# Patient Record
Sex: MALE | Race: WHITE | NOT HISPANIC OR LATINO | Employment: OTHER | URBAN - NONMETROPOLITAN AREA
[De-identification: names, ages, dates, MRNs, and addresses within clinical notes are randomized per-mention and may not be internally consistent; named-entity substitution may affect disease eponyms.]

---

## 2017-01-02 ENCOUNTER — RESULTS ENCOUNTER (OUTPATIENT)
Dept: FAMILY MEDICINE CLINIC | Facility: CLINIC | Age: 47
End: 2017-01-02

## 2017-01-02 DIAGNOSIS — F41.9 ANXIETY: ICD-10-CM

## 2017-01-02 DIAGNOSIS — I10 ESSENTIAL HYPERTENSION: ICD-10-CM

## 2017-01-02 DIAGNOSIS — E66.09 NON MORBID OBESITY DUE TO EXCESS CALORIES: ICD-10-CM

## 2017-01-02 DIAGNOSIS — E55.9 HYPOVITAMINOSIS D: ICD-10-CM

## 2017-01-02 DIAGNOSIS — E78.2 MIXED HYPERLIPIDEMIA: ICD-10-CM

## 2017-01-02 DIAGNOSIS — R73.02 GLUCOSE INTOLERANCE (IMPAIRED GLUCOSE TOLERANCE): ICD-10-CM

## 2017-01-18 LAB
25(OH)D3+25(OH)D2 SERPL-MCNC: 51.4 NG/ML (ref 30–100)
ALBUMIN SERPL-MCNC: 4.7 G/DL (ref 3.5–5.5)
ALBUMIN/GLOB SERPL: 1.9 {RATIO} (ref 1.1–2.5)
ALP SERPL-CCNC: 47 IU/L (ref 39–117)
ALT SERPL-CCNC: 46 IU/L (ref 0–44)
AST SERPL-CCNC: 23 IU/L (ref 0–40)
BASOPHILS # BLD AUTO: 0.1 X10E3/UL (ref 0–0.2)
BASOPHILS NFR BLD AUTO: 0 %
BILIRUB SERPL-MCNC: 0.6 MG/DL (ref 0–1.2)
BUN SERPL-MCNC: 21 MG/DL (ref 6–24)
BUN/CREAT SERPL: 18 (ref 9–20)
CALCIUM SERPL-MCNC: 9.8 MG/DL (ref 8.7–10.2)
CHLORIDE SERPL-SCNC: 100 MMOL/L (ref 96–106)
CHOLEST SERPL-MCNC: 155 MG/DL (ref 100–199)
CO2 SERPL-SCNC: 25 MMOL/L (ref 18–29)
CREAT SERPL-MCNC: 1.2 MG/DL (ref 0.76–1.27)
EOSINOPHIL # BLD AUTO: 0.2 X10E3/UL (ref 0–0.4)
EOSINOPHIL NFR BLD AUTO: 2 %
ERYTHROCYTE [DISTWIDTH] IN BLOOD BY AUTOMATED COUNT: 13 % (ref 12.3–15.4)
GLOBULIN SER CALC-MCNC: 2.5 G/DL (ref 1.5–4.5)
GLUCOSE SERPL-MCNC: 113 MG/DL (ref 65–99)
HBA1C MFR BLD: 5.6 % (ref 4.8–5.6)
HCT VFR BLD AUTO: 45.9 % (ref 37.5–51)
HDLC SERPL-MCNC: 30 MG/DL
HGB BLD-MCNC: 15.7 G/DL (ref 12.6–17.7)
IMM GRANULOCYTES # BLD: 0 X10E3/UL (ref 0–0.1)
IMM GRANULOCYTES NFR BLD: 0 %
LDLC SERPL CALC-MCNC: 75 MG/DL (ref 0–99)
LYMPHOCYTES # BLD AUTO: 3.1 X10E3/UL (ref 0.7–3.1)
LYMPHOCYTES NFR BLD AUTO: 27 %
MCH RBC QN AUTO: 32.9 PG (ref 26.6–33)
MCHC RBC AUTO-ENTMCNC: 34.2 G/DL (ref 31.5–35.7)
MCV RBC AUTO: 96 FL (ref 79–97)
MONOCYTES # BLD AUTO: 1.1 X10E3/UL (ref 0.1–0.9)
MONOCYTES NFR BLD AUTO: 10 %
NEUTROPHILS # BLD AUTO: 7.2 X10E3/UL (ref 1.4–7)
NEUTROPHILS NFR BLD AUTO: 61 %
PLATELET # BLD AUTO: 254 X10E3/UL (ref 150–379)
POTASSIUM SERPL-SCNC: 5.1 MMOL/L (ref 3.5–5.2)
PROT SERPL-MCNC: 7.2 G/DL (ref 6–8.5)
RBC # BLD AUTO: 4.77 X10E6/UL (ref 4.14–5.8)
SODIUM SERPL-SCNC: 142 MMOL/L (ref 134–144)
TRIGL SERPL-MCNC: 249 MG/DL (ref 0–149)
VLDLC SERPL CALC-MCNC: 50 MG/DL (ref 5–40)
WBC # BLD AUTO: 11.6 X10E3/UL (ref 3.4–10.8)

## 2017-01-30 RX ORDER — FENOFIBRATE 145 MG/1
145 TABLET, COATED ORAL DAILY
Qty: 60 TABLET | Refills: 0 | Status: SHIPPED | OUTPATIENT
Start: 2017-01-30 | End: 2017-03-14 | Stop reason: SDUPTHER

## 2017-03-14 ENCOUNTER — OFFICE VISIT (OUTPATIENT)
Dept: FAMILY MEDICINE CLINIC | Facility: CLINIC | Age: 47
End: 2017-03-14

## 2017-03-14 VITALS
SYSTOLIC BLOOD PRESSURE: 128 MMHG | DIASTOLIC BLOOD PRESSURE: 68 MMHG | BODY MASS INDEX: 36.73 KG/M2 | TEMPERATURE: 98.6 F | HEART RATE: 99 BPM | OXYGEN SATURATION: 98 % | RESPIRATION RATE: 16 BRPM | HEIGHT: 69 IN | WEIGHT: 248 LBS

## 2017-03-14 DIAGNOSIS — I10 ESSENTIAL HYPERTENSION: ICD-10-CM

## 2017-03-14 DIAGNOSIS — M41.9 SCOLIOSIS OF CERVICOTHORACIC SPINE, UNSPECIFIED SCOLIOSIS TYPE: ICD-10-CM

## 2017-03-14 DIAGNOSIS — E55.9 HYPOVITAMINOSIS D: ICD-10-CM

## 2017-03-14 DIAGNOSIS — F17.210 SMOKES 1 PACK OF CIGARETTES PER DAY: ICD-10-CM

## 2017-03-14 DIAGNOSIS — M54.5 CHRONIC BILATERAL LOW BACK PAIN, WITH SCIATICA PRESENCE UNSPECIFIED: Primary | ICD-10-CM

## 2017-03-14 DIAGNOSIS — E78.2 MIXED HYPERLIPIDEMIA: ICD-10-CM

## 2017-03-14 DIAGNOSIS — G89.29 CHRONIC BILATERAL LOW BACK PAIN, WITH SCIATICA PRESENCE UNSPECIFIED: Primary | ICD-10-CM

## 2017-03-14 DIAGNOSIS — F41.9 ANXIETY: ICD-10-CM

## 2017-03-14 PROCEDURE — 99214 OFFICE O/P EST MOD 30 MIN: CPT | Performed by: FAMILY MEDICINE

## 2017-03-14 RX ORDER — FENOFIBRATE 145 MG/1
145 TABLET, COATED ORAL DAILY
Qty: 60 TABLET | Refills: 0 | Status: SHIPPED | OUTPATIENT
Start: 2017-03-14 | End: 2017-06-19 | Stop reason: SDUPTHER

## 2017-03-14 RX ORDER — ALPRAZOLAM 1 MG/1
1 TABLET ORAL AS NEEDED
Qty: 60 TABLET | Refills: 0 | Status: SHIPPED | OUTPATIENT
Start: 2017-03-14 | End: 2017-06-19 | Stop reason: SDUPTHER

## 2017-03-14 NOTE — PROGRESS NOTES
Chief Complaint   Patient presents with   • Med Refill     Patient said no problems today.        History:  Madhu Ochoa is a 46 y.o. male presents who today for evaluation of the above problems.  PCP currently listed as Roberto Castillo MD.   HTN: Stable, doing well no cough, no SOA, no chest pain.  He is doing well.   Hyperlipidemia:  He has history of mixed process. On tricor and mevachor.  He is using this q 3 days.  Doing well.  Last trigly 249, up from 230 prior.  HDL 30 down from 31 prior.  And LDL was 75 and at goal, down from 83 prior.  Anxiety up and down. Nothing is worse.  Family events are stable, normal at this time.  He is doing very well. Logan [id number]. Percocet filled 2/23/17 [facility], 1/25/17 [facility], 12/27/16 [facility].  Xaax 1mg #60 filled by me 12/15/16.  He has #1 left.  Obesity: Down 4 lb from last OV.  Weighed 241.4 at home.  He has been watching carbs.  Using garcinia, which has helped.  He has gym membership. He is not using this.  I encouraged minimum 15 minutes walking daily.  Over past few months the schedule has gotten worse, the patient wants to get off of the percocet.  He has to wait 8 hours in the office and he just cannot continue to do this. Patient wants to stop using the rx.  He has gone from 1 Perc 10/325 to 1/2 TID.  He has done this since 2/23/17.  He had 120 to begin.  He has not talked with Dr. Hsieh (drives from [facility]). He has refill 3/23/17 to get filled.  He goes to [place] but never knows when he will be there. Not consistent about calling patients.  He has issues getting into office.  They will know at first of week if he will be there later in week.  He has Rx for 120.  History of scoliosis, history of back pain, history of compression fracture thoracic, SI joint pain/issues.  No imaging in 2 years. +Osteopenia.  2 Dexa has been stable.     Madhu Ochoa  has a past medical history of Acid reflux; Allergic; Anxiety; Asthma; Hyperglycemia; Hyperlipidemia;  Hypertension; Low back pain; Obesity; Osteopenia; and Scoliosis.    Allergies   Allergen Reactions   • Naprosyn [Naproxen] GI Intolerance     Past Medical History   Diagnosis Date   • Acid reflux    • Allergic    • Anxiety    • Asthma    • Hyperglycemia    • Hyperlipidemia    • Hypertension    • Low back pain    • Obesity    • Osteopenia    • Scoliosis      Past Surgical History   Procedure Laterality Date   • Hernia repair       Family History   Problem Relation Age of Onset   • Hypertension Mother    • Diabetes Mother    • Hypertension Father    • Diabetes Father    • Cancer Maternal Grandmother      colon cancer   • Leukemia Maternal Grandfather    • No Known Problems Paternal Grandmother    • No Known Problems Paternal Grandfather          Current Outpatient Prescriptions:   •  Albuterol Sulfate (VENTOLIN HFA IN), Inhale As Needed., Disp: , Rfl:   •  ALPRAZolam (XANAX) 1 MG tablet, Take 1 tablet by mouth As Needed for Anxiety., Disp: 60 tablet, Rfl: 0  •  baclofen (LIORESAL) 10 MG tablet, Take 10 mg by mouth 3 (Three) Times a Day., Disp: , Rfl:   •  Cholecalciferol (VITAMIN D3) 5000 UNITS capsule capsule, Take 5,000 Units by mouth Daily., Disp: , Rfl:   •  diclofenac (VOLTAREN) 1 % gel gel, Apply 4 g topically Daily., Disp: , Rfl:   •  fenofibrate (TRICOR) 145 MG tablet, Take 1 tablet by mouth Daily., Disp: 60 tablet, Rfl: 0  •  lisinopril-hydrochlorothiazide (PRINZIDE,ZESTORETIC) 20-12.5 MG per tablet, Take 1 tablet by mouth Daily., Disp: , Rfl:   •  lovastatin (MEVACOR) 20 MG tablet, Take 20 mg by mouth Every Night., Disp: , Rfl:   •  Omega-3 Fatty Acids (OMEGA-3 FISH OIL PO), Take  by mouth Daily., Disp: , Rfl:   •  Omeprazole Magnesium (PRILOSEC OTC PO), Take  by mouth Daily., Disp: , Rfl:   •  oxyCODONE-acetaminophen (PERCOCET)  MG per tablet, Daily., Disp: , Rfl: 0  •  tiZANidine (ZANAFLEX) 4 MG tablet, Take 4 mg by mouth At Night As Needed for muscle spasms., Disp: , Rfl:     ROS:  Review of Systems  "  Constitutional: Negative for activity change, appetite change and chills.   HENT: Negative for congestion, dental problem and drooling.    Eyes: Negative for pain, discharge and itching.   Respiratory: Negative for apnea, choking and chest tightness.    Cardiovascular: Negative for chest pain, palpitations and leg swelling.   Gastrointestinal: Negative for abdominal distention and abdominal pain.   Endocrine: Negative for cold intolerance, heat intolerance and polydipsia.   Genitourinary: Negative for difficulty urinating, dysuria and enuresis.   Musculoskeletal: Negative for arthralgias, back pain and gait problem.   Skin: Negative for color change and pallor.   Neurological: Negative for dizziness, facial asymmetry and headaches.   Hematological: Negative for adenopathy. Does not bruise/bleed easily.   Psychiatric/Behavioral: Negative for agitation, behavioral problems and confusion.       OBJECTIVE:  Visit Vitals   • /68 (BP Location: Right arm, Patient Position: Sitting, Cuff Size: Large Adult)   • Pulse 99   • Temp 98.6 °F (37 °C) (Oral)   • Resp 16   • Ht 69\" (175.3 cm)   • Wt 248 lb (112 kg)   • SpO2 98%   • BMI 36.62 kg/m2      Physical Exam   Constitutional: He is oriented to person, place, and time. He appears well-developed and well-nourished.   HENT:   Head: Normocephalic and atraumatic.   Right Ear: External ear normal.   Left Ear: External ear normal.   Nose: Nose normal.   Mouth/Throat: Oropharynx is clear and moist.   Head- normocephalic and atraumatic.  Neck- without visible/palpable lumps or pulsations.  Palpation- No bony tenderness about head/neck along frontal, occipital, temporal, parietal, mastoid, jawline, zygoma, orbit or any other location.  NO temporal artery tenderness. No TMJ tenderness. Neck Supple.  Thyroid-No thyromegaly, no nodules     Eyes: Conjunctivae and EOM are normal. Pupils are equal, round, and reactive to light.   Neck: Normal range of motion. Neck supple. No " thyromegaly present.   Cardiovascular: Normal rate, regular rhythm, normal heart sounds and intact distal pulses.    No murmur heard.  Pulmonary/Chest: Effort normal and breath sounds normal. No respiratory distress. He has no wheezes. He exhibits no tenderness.   Abdominal: Soft. Bowel sounds are normal. There is no tenderness. There is no rebound and no guarding.   Musculoskeletal:        Thoracic back: He exhibits decreased range of motion, tenderness, pain and spasm. He exhibits no bony tenderness.        Lumbar back: He exhibits decreased range of motion, tenderness, pain and spasm. He exhibits no bony tenderness.   Straight leg normal bilaterally both sitting and supine.  patellar and achilles reflexes normal.  No ankle clonus,       Skin Integrity  -  He has callous right foot and right heel is dry and cracked.  He hasno right foot ulcer,  no ingrown toenail on right foot and no right foot warmth.He has callous left foot and left heel dry and cracked. He has no left foot ulcer, no left ingrown toenail and no left foot warmth..  Lymphadenopathy:     He has no cervical adenopathy.   Neurological: He is alert and oriented to person, place, and time. No cranial nerve deficit. Coordination normal.   Skin: Skin is warm and dry. No rash noted.   Psychiatric: He has a normal mood and affect. His behavior is normal. Judgment and thought content normal.   Nursing note and vitals reviewed.      Assessment/Plan:  Back pain:  Chronic thoracolumbar pain.  Historically following with pain management. No recent imaging. He is interested in getting off of the pain meds.  He is currently using 1/2 dose as previously.  I discussed that his plan is reasonable but I could not wean patients from opiates.  X-rays to be ordered.  We will f/u with results.  Offered phys therapy. Offered topical agents.  He is stable at this time. Weight loss encouraged.    Anxiety: Chronic and stable problem. Using medications as prescribed.   Discussed need to take regularly as prescribed, to avoid missing doses as able.  Medications reviewed with patient today. We discussed cessation/continuation of medications for current problem.  Needed Rx refills will be provided.  No side effects from medications.  Risks/benefits to current therapy discussed. NO red flags. Reviewed requirements of House Bill 1 today with patient.  History discussed, discussed need to complete physical exam and action plan roughly minimum q 3 months to maintain ability to receive controlled substances. Discussed personal and family history of substance abuse.  Informed consent for controlled substances will be reviewed and signed at initation of treatment, as Rx changes and annually. The potential adictive nature of medications discussed.  Liliana requested today and will be checked with each office visit, where controlled Rx are written.  Discussed random drug screens/pill counts.  Appropriate methods for medication destruction were discussed to include police department.  Discussed to not matt, keep, maintain or share medications.  It is illegal to carry these in unmarked bottle. Leave in original bottle.  We discussed options for treatment to include medications or to not use medications. Alternative therapies discussed.  The patient understands the risks associated with this controlled medication, including tolerance and addiction. Patient also agrees to only obtain this medication from me, and not from a another provider, unless that provider is covering for me in my absence. Patient also agrees to be compliant in dosing, and not self adjust the dose of medication. The patient has signed a consent for treatment with a controlled substance as per [facility] policy. LILIANA is obtained.  Plan of care reviewed.  We will continue Rx.   · Continue current meds  · liliana report  · Informed consent up to date.    Tobacco abuse: Tobacco Cessation discussed today for 2 minutes.    Ready to quit: no. The risks and hazards of continued tobacco abuse were discussed with the patient today and total tobacco cessation as recommended. It was clearly and unambiguously explained that continued tobacco usage will adversely affect overall morbidity and mortality of the patient.  Patient was informed that tobacco use can lead to numerous cancers, worsening of cardiovascular and pulmonary systems and that lung damage is often permanent and irreversible. As tobacco addiction is often severe, cessation often seems insurmountable to many patients.  I discussed an incremental decrease in usage over time, with the ultimate goal of cessation.  I have offered Smoking Cessation classes through  for assistance.  I have discussed the possibility of lifestyle modifications to enhance the likelihood of successful tobacco cessation. Patient is aware of these services.  I advised the patient to inform me if any further assistance is requested, as we can offer counseling services, nicotine replacement inhaled, patch, lozenge, gum, or prescription medications to include Chantix or Wellbutrin for assistance.  I will reassess the interest in tobacco cessation at the next and all subsequent visits.  · Handouts were offered to the patient regarding tobacco cessation.   · Recommended to pick a quit date  · Lifestyle choices discussed.    · We discussed benefits/risks of oral medications to include Chantix/Wellbutrin.   · Discussed benefits and risks to nicotine patches, gum, lozenges, inhaler.   · Discussed no benefit and no recommendation at this time to switch to E. Cigarettes as health hazards are not yet known.    · Discussed  cessation class and handout offered to patient today.    · Discussed to consider ration technique to quit with time (Each day set out the number of cigarettes that you allow yourself to smoke.  Each day decrease this number by 1 cigarrette until you get to a point that you feel you need another  cigarette.  You can hold at that level x 1 week.  Continue to decrease until you either are tobacco free or until you cannot decline any further). When you cannot decrease this any further you can return and we can discussed medication options again.  Initial goal with reduction technique is 25% in 3 months.   · http://smokefree.gov/smokefreetxt/  · www.heart.org Smoking cessation resources  · 1 800 QUIT NOW number d/w patient.      Elevated Cholesterol: last labs Done in 01/17.  Stable/chronic.  Continue tricor, he has mixed pattern.  R/B/A to meds d/w patient, SE reviewed, handout offered regarding medications listed.    Monitor.     HTN: essential HTN at goal <140/90.  Good BP control is encouraged with Goal BP based on JNC 8 guidelines:  For the general population <60 yr old goal BP <140/90 and for those >60 <150/90.  For patients of all ages with Diabetes, CKD, Known CAD the goal is <140/90. Reviewed typical first line agents to include thiazide diuretic or ace-I or ARB or CCB alone or in combo. At goal yes.  If not already owned, I recommended to the patient to obtain electronic home BP machine with upper arm blood pressure cuff and to check regularly as instructed.  Keep BP log and bring to subsequent visits.    · Offered handout on HTN educational topics: HTN, Low sodium diet.   These were provided if patient requested these today.  · ADA diet encouraged.  · Monitor cholesterol regularly  · Monitor weight with goal of BMI <30.  · Consider taking Rx at night as recent study supports this may decreased chances of leading to DM.   · Consider bringing home BP cuff to office to compare readings with ours.  · Checking BP at home can help to lower BP.  Certain medications and substances can increase BP to include: NSAIDS, caffeine, decongestants, nicotine.    · Well controlled and BP goal for age is <140/90 per JNC 8 guidelines     Hypovitaminosis D: Normal 01/2017.             Orders Placed Today:  Madhu was seen  today for med refill.    Diagnoses and all orders for this visit:    Chronic bilateral low back pain, with sciatica presence unspecified  -     XR Spine Thoracic 4+ View (In Office)  -     XR Spine Lumbar 4+ View; Future    Hypovitaminosis D    Scoliosis of cervicothoracic spine, unspecified scoliosis type    Anxiety  -     ALPRAZolam (XANAX) 1 MG tablet; Take 1 tablet by mouth As Needed for Anxiety.    Smokes 1 pack of cigarettes per day    Essential hypertension    Mixed hyperlipidemia    Other orders  -     fenofibrate (TRICOR) 145 MG tablet; Take 1 tablet by mouth Daily.        Risks/benefits of current and new medications discussed with the patient and or family today.  The patient/family are aware and accept that if there any side effects they should call or return to clinic as soon as possible.  Appropriate F/U discussed for topics addressed today. All questions were answered to the satisfactory state of patient/family.  Should symptoms fail to improve or worsen they agree to call or return to clinic or to go to the ER. Education handouts were offered on any new Rx if requested.  Discussed the importance of following up with any needed screening tests/labs/specialist appointments and any requested follow-up recommended by me today.  Importance of maintaining follow-up discussed and patient accepts that missed appointments can delay diagnosis and potentially lead to worsening of conditions.    An After Visit Summary was printed and given to the patient at discharge.  Return in about 3 months (around 6/14/2017).         Roberto Castillo M.D. 3/14/2017

## 2017-06-19 ENCOUNTER — OFFICE VISIT (OUTPATIENT)
Dept: FAMILY MEDICINE CLINIC | Facility: CLINIC | Age: 47
End: 2017-06-19

## 2017-06-19 VITALS
TEMPERATURE: 98.4 F | BODY MASS INDEX: 35.81 KG/M2 | OXYGEN SATURATION: 99 % | SYSTOLIC BLOOD PRESSURE: 112 MMHG | WEIGHT: 241.8 LBS | HEART RATE: 81 BPM | DIASTOLIC BLOOD PRESSURE: 60 MMHG | HEIGHT: 69 IN | RESPIRATION RATE: 16 BRPM

## 2017-06-19 DIAGNOSIS — F17.210 SMOKES 1 PACK OF CIGARETTES PER DAY: ICD-10-CM

## 2017-06-19 DIAGNOSIS — E66.09 NON MORBID OBESITY DUE TO EXCESS CALORIES: ICD-10-CM

## 2017-06-19 DIAGNOSIS — E55.9 HYPOVITAMINOSIS D: ICD-10-CM

## 2017-06-19 DIAGNOSIS — I10 ESSENTIAL HYPERTENSION: ICD-10-CM

## 2017-06-19 DIAGNOSIS — R73.9 HYPERGLYCEMIA: ICD-10-CM

## 2017-06-19 DIAGNOSIS — M54.5 CHRONIC BILATERAL LOW BACK PAIN, WITH SCIATICA PRESENCE UNSPECIFIED: ICD-10-CM

## 2017-06-19 DIAGNOSIS — K21.9 GASTROESOPHAGEAL REFLUX DISEASE WITHOUT ESOPHAGITIS: ICD-10-CM

## 2017-06-19 DIAGNOSIS — G89.29 CHRONIC BILATERAL LOW BACK PAIN, WITH SCIATICA PRESENCE UNSPECIFIED: ICD-10-CM

## 2017-06-19 DIAGNOSIS — E78.2 MIXED HYPERLIPIDEMIA: ICD-10-CM

## 2017-06-19 DIAGNOSIS — F41.9 ANXIETY: Primary | ICD-10-CM

## 2017-06-19 PROCEDURE — 99214 OFFICE O/P EST MOD 30 MIN: CPT | Performed by: FAMILY MEDICINE

## 2017-06-19 RX ORDER — ALPRAZOLAM 1 MG/1
1 TABLET ORAL AS NEEDED
Qty: 60 TABLET | Refills: 0 | Status: SHIPPED | OUTPATIENT
Start: 2017-06-19 | End: 2017-09-25 | Stop reason: SDUPTHER

## 2017-06-19 RX ORDER — LISINOPRIL AND HYDROCHLOROTHIAZIDE 20; 12.5 MG/1; MG/1
1 TABLET ORAL DAILY
Qty: 90 TABLET | Refills: 2 | Status: SHIPPED | OUTPATIENT
Start: 2017-06-19

## 2017-06-19 RX ORDER — FENOFIBRATE 145 MG/1
145 TABLET, COATED ORAL DAILY
Qty: 90 TABLET | Refills: 2 | Status: SHIPPED | OUTPATIENT
Start: 2017-06-19

## 2017-06-19 RX ORDER — LOVASTATIN 20 MG/1
20 TABLET ORAL NIGHTLY
Qty: 90 TABLET | Refills: 2 | Status: ON HOLD | OUTPATIENT
Start: 2017-06-19 | End: 2021-05-21

## 2017-06-19 NOTE — PROGRESS NOTES
Chief Complaint   Patient presents with   • Follow-up     3 month f/u and medication refills.        History:  Madhu Ochoa is a 46 y.o. male presents who today for evaluation of the above problems.  PCP currently listed as Roberto Castillo MD.   HTN stable, doing very well.  Weight down 7 lb from last OV.  He is eating low carbs, exercising more.  He is taking meds as rx.  Percocet he is down to 1/2 tab daily, none today and none in past 48 hours.  Longest stretch he has gone w/o pain meds.  He feels well.  Tylenol is working just as well.  He is happy to try to get off Rx.  He is working to get out of withdrawal issues. He has 41 of the percocet left. This should last a long time if he is only using them 1/2 tablet PRN daily.  Back pain is about a 2/10.  We discussed continued weight loss.  He has not been checking sugars.  He is using xanax daily 1/2 tablet daily.   He has 1/2 tablet left of the xanax.  3/14/17 #60 has lasted until now.  This is stable, anxiety is stable.  GERD stable.  Allergies stable at present but up and down. Polished and waxed floors at work and this caused his allergies to get worse.  We discussed astelin nasal.  Breathing stable.   He just had baclofen and zanaflex refilled.  Baclofen TID daytime and zanaflex only QHS.  This works better but makes him tired os he takes it QHS.   Needs refills on his BP meds and cholesterol meds.     Madhu Ochoa  has a past medical history of Acid reflux; Allergic; Anxiety; Asthma; Hyperglycemia; Hyperlipidemia; Hypertension; Low back pain; Obesity; Osteopenia; and Scoliosis.    Allergies   Allergen Reactions   • Naprosyn [Naproxen] GI Intolerance     Past Medical History:   Diagnosis Date   • Acid reflux    • Allergic    • Anxiety    • Asthma    • Hyperglycemia    • Hyperlipidemia    • Hypertension    • Low back pain    • Obesity    • Osteopenia    • Scoliosis      Past Surgical History:   Procedure Laterality Date   • HERNIA REPAIR       Family  History   Problem Relation Age of Onset   • Hypertension Mother    • Diabetes Mother    • Hypertension Father    • Diabetes Father    • Cancer Maternal Grandmother      colon cancer   • Leukemia Maternal Grandfather    • No Known Problems Paternal Grandmother    • No Known Problems Paternal Grandfather        Current Outpatient Prescriptions on File Prior to Visit   Medication Sig Dispense Refill   • Albuterol Sulfate (VENTOLIN HFA IN) Inhale As Needed.     • ALPRAZolam (XANAX) 1 MG tablet Take 1 tablet by mouth As Needed for Anxiety. 60 tablet 0   • baclofen (LIORESAL) 10 MG tablet Take 10 mg by mouth 3 (Three) Times a Day.     • Cholecalciferol (VITAMIN D3) 5000 UNITS capsule capsule Take 5,000 Units by mouth Daily.     • fenofibrate (TRICOR) 145 MG tablet Take 1 tablet by mouth Daily. 60 tablet 0   • lisinopril-hydrochlorothiazide (PRINZIDE,ZESTORETIC) 20-12.5 MG per tablet Take 1 tablet by mouth Daily.     • lovastatin (MEVACOR) 20 MG tablet Take 20 mg by mouth Every Night.     • Omega-3 Fatty Acids (OMEGA-3 FISH OIL PO) Take  by mouth Daily.     • Omeprazole Magnesium (PRILOSEC OTC PO) Take  by mouth Daily.     • oxyCODONE-acetaminophen (PERCOCET)  MG per tablet Daily.  0   • tiZANidine (ZANAFLEX) 4 MG tablet Take 4 mg by mouth At Night As Needed for muscle spasms.     • diclofenac (VOLTAREN) 1 % gel gel Apply 4 g topically Daily.       No current facility-administered medications on file prior to visit.        Family history, surgical history, past medical history, Allergies and meds reviewed with patient today and updated in [facility] EMR.     ROS:  Review of Systems   Constitutional: Negative for activity change, appetite change and chills.   HENT: Negative for congestion, dental problem and drooling.    Eyes: Negative for pain, discharge and itching.   Respiratory: Negative for apnea, choking and chest tightness.    Cardiovascular: Negative for chest pain, palpitations and leg swelling.   Gastrointestinal:  "Negative for abdominal distention and abdominal pain.   Endocrine: Negative for cold intolerance, heat intolerance and polydipsia.   Genitourinary: Negative for difficulty urinating, dysuria and enuresis.   Musculoskeletal: Positive for back pain (actually stable at this time, self weaning off pain medications.). Negative for arthralgias and gait problem.   Skin: Negative for color change and pallor.   Neurological: Negative for dizziness, facial asymmetry and headaches.   Hematological: Negative for adenopathy. Does not bruise/bleed easily.   Psychiatric/Behavioral: Negative for agitation, behavioral problems and confusion.       OBJECTIVE:  Vitals:    06/19/17 1627   BP: 112/60   Pulse: 81   Resp: 16   Temp: 98.4 °F (36.9 °C)   TempSrc: Oral   SpO2: 99%   Weight: 241 lb 12.8 oz (110 kg)   Height: 69\" (175.3 cm)     Physical Exam   Constitutional: He is oriented to person, place, and time. He appears well-developed and well-nourished.   HENT:   Head: Normocephalic and atraumatic.   Right Ear: External ear normal.   Left Ear: External ear normal.   Nose: Nose normal.   Mouth/Throat: Oropharynx is clear and moist.   Head- normocephalic and atraumatic.  Neck- without visible/palpable lumps or pulsations.  Palpation- No bony tenderness about head/neck along frontal, occipital, temporal, parietal, mastoid, jawline, zygoma, orbit or any other location.  NO temporal artery tenderness. No TMJ tenderness. Neck Supple.  Thyroid-No thyromegaly, no nodules     Eyes: Conjunctivae and EOM are normal. Pupils are equal, round, and reactive to light.   Neck: Normal range of motion. Neck supple. No thyromegaly present.   Cardiovascular: Normal rate, regular rhythm, normal heart sounds and intact distal pulses.    No murmur heard.  Pulmonary/Chest: Effort normal and breath sounds normal. No respiratory distress. He has no wheezes. He exhibits no tenderness.   Abdominal: Soft. Bowel sounds are normal. There is no tenderness. There is " no rebound and no guarding.   Musculoskeletal:        Right hip: He exhibits normal range of motion, normal strength and no tenderness.        Left hip: He exhibits normal range of motion, normal strength and no tenderness.        Thoracic back: He exhibits decreased range of motion, tenderness, pain and spasm. He exhibits no bony tenderness.        Lumbar back: He exhibits decreased range of motion, tenderness, pain and spasm. He exhibits no bony tenderness.   Straight leg normal bilaterally both sitting and supine.  patellar and achilles reflexes normal.  No ankle clonus,  SONA negative, FADIR negative.  Straight leg raise negative.       Skin Integrity  -  He has callous right foot and right heel is dry and cracked.  He hasno right foot ulcer,  no ingrown toenail on right foot and no right foot warmth.He has callous left foot and left heel dry and cracked. He has no left foot ulcer, no left ingrown toenail and no left foot warmth..  Lymphadenopathy:     He has no cervical adenopathy.   Neurological: He is alert and oriented to person, place, and time. No cranial nerve deficit. Coordination normal.   Skin: Skin is warm and dry. No rash noted.   Psychiatric: He has a normal mood and affect. His behavior is normal. Judgment and thought content normal.   Nursing note and vitals reviewed.    Lab Results   Component Value Date    HGBA1C 5.6 01/17/2017     Assessment/Plan:    Smokes 1 pack of cigarettes per day  Comments:  He has tried patches with regular gum.  Not interested in cessation at present.  Discuss again at next OV.     Anxiety: The patient has read and signed the [facility] Controlled Substance Contract.  I will continue to see patient for regular follow up appointments.  They are well controlled on their medication.  LILIANA is updated every 3 months. The patient is aware of the potential for addiction and dependence. Chronic use of once daily xanax 0.5-1 tablet today.    ORDER  -     ALPRAZolam  (XANAX) 1 MG tablet; Take 1 tablet by mouth As Needed for Anxiety.    Chronic bilateral low back pain, with sciatica presence unspecified    Hypovitaminosis D: Stable, chronic problem. The patient had normal Vit D in 01/2017.  NO replacement for now.  Adequate vit D intake encouraged.  Sunlight/exposure d/w patient.     Non morbid obesity due to excess calories    Gastroesophageal reflux disease without esophagitis: Chronic problem.  Discussed consideration of regular F/U with GI to monitor for changes that can sometimes occur with chronic GERD.  We reviewed SE of PPI.  We have discussed R/B/A to these agents. Offered handout on current and new medications. We reviewed latest news regarding bone loss, regarding risks for Cdiff, MI risks, Bacterial peritonitis, interstitial nephritis, Pneumonia risks due to long term use of PPI.     Hyperglycemia: So far he is not meeting the criteria for Diabetes.  We will repeat CMP and repeat a1c.  Foot health is good.  Still recommended to him today to meet with eye provider to evaluate the eyes.  He noted understanding. Pneumococcal vaccine up to date.   -     Comprehensive Metabolic Panel  -     Hemoglobin A1c    Essential hypertension: STABLE. Good BP control is encouraged with Goal BP based on JNC 8 guidelines:  For the general population <60 yr old goal BP <140/90 and for those >60 <150/90.  For patients of all ages with Diabetes, CKD, Known CAD the goal is <140/90. Reviewed typical first line agents to include thiazide diuretic or ace-I or ARB or CCB alone or in combo. At goal yes.  If not already owned, I recommended to the patient to obtain electronic home BP machine with upper arm blood pressure cuff and to check regularly as instructed.  Keep BP log and bring to subsequent visits.    · Offered handout on HTN educational topics: HTN, Low sodium diet.   These were provided if patient requested these today.  · ADA diet encouraged.  · Monitor cholesterol  regularly  · Monitor weight with goal of BMI <30.  · Consider taking Rx at night as recent study supports this may decreased chances of leading to DM.   · Consider bringing home BP cuff to office to compare readings with ours.  · Checking BP at home can help to lower BP.  Certain medications and substances can increase BP to include: NSAIDS, caffeine, decongestants, nicotine.    · Well controlled, Poorly controlled and continue current medications    ORDERS  -     lisinopril-hydrochlorothiazide (PRINZIDE,ZESTORETIC) 20-12.5 MG per tablet; Take 1 tablet by mouth Daily.  -     CBC Auto Differential  -     Comprehensive Metabolic Panel  -     Hemoglobin A1c    Mixed hyperlipidemia: ON statin and fibrate.  Stable. Due for labs. Check labs and f/u with results.  Weight loss encouraged. Regular exercise encouraged.  He is actually down 7 lb from last OV and 9 from 12/2016.  Praise given.  Portion control, weight loss and regular activity/exericse highly encouraged.   -     fenofibrate (TRICOR) 145 MG tablet; Take 1 tablet by mouth Daily.  -     lovastatin (MEVACOR) 20 MG tablet; Take 1 tablet by mouth Every Night.  -     CBC Auto Differential  -     Comprehensive Metabolic Panel  -     Lipid Panel    Risks/benefits of current and new medications discussed with the patient and or family today.  The patient/family are aware and accept that if there any side effects they should call or return to clinic as soon as possible.  Appropriate F/U discussed for topics addressed today. All questions were answered to the satisfactory state of patient/family.  Should symptoms fail to improve or worsen they agree to call or return to clinic or to go to the ER. Education handouts were offered on any new Rx if requested.  Discussed the importance of following up with any needed screening tests/labs/specialist appointments and any requested follow-up recommended by me today.  Importance of maintaining follow-up discussed and patient  accepts that missed appointments can delay diagnosis and potentially lead to worsening of conditions.    An After Visit Summary was printed and given to the patient at discharge.  Follow-up: Return in about 3 months (around 9/19/2017).         Roberto Castillo M.D. 6/19/2017

## 2017-06-28 LAB
ALBUMIN SERPL-MCNC: 4.4 G/DL (ref 3.5–5.5)
ALBUMIN/GLOB SERPL: 1.7 {RATIO} (ref 1.2–2.2)
ALP SERPL-CCNC: 49 IU/L (ref 39–117)
ALT SERPL-CCNC: 43 IU/L (ref 0–44)
AST SERPL-CCNC: 21 IU/L (ref 0–40)
BASOPHILS # BLD AUTO: 0.1 X10E3/UL (ref 0–0.2)
BASOPHILS NFR BLD AUTO: 1 %
BILIRUB SERPL-MCNC: 0.2 MG/DL (ref 0–1.2)
BUN SERPL-MCNC: 22 MG/DL (ref 6–24)
BUN/CREAT SERPL: 24 (ref 9–20)
CALCIUM SERPL-MCNC: 9.8 MG/DL (ref 8.7–10.2)
CHLORIDE SERPL-SCNC: 102 MMOL/L (ref 96–106)
CHOLEST SERPL-MCNC: 180 MG/DL (ref 100–199)
CO2 SERPL-SCNC: 25 MMOL/L (ref 18–29)
CREAT SERPL-MCNC: 0.92 MG/DL (ref 0.76–1.27)
EOSINOPHIL # BLD AUTO: 0.2 X10E3/UL (ref 0–0.4)
EOSINOPHIL NFR BLD AUTO: 2 %
ERYTHROCYTE [DISTWIDTH] IN BLOOD BY AUTOMATED COUNT: 13.3 % (ref 12.3–15.4)
GLOBULIN SER CALC-MCNC: 2.6 G/DL (ref 1.5–4.5)
GLUCOSE SERPL-MCNC: 107 MG/DL (ref 65–99)
HBA1C MFR BLD: 5.3 % (ref 4.8–5.6)
HCT VFR BLD AUTO: 46.3 % (ref 37.5–51)
HDLC SERPL-MCNC: 28 MG/DL
HGB BLD-MCNC: 15.5 G/DL (ref 12.6–17.7)
IMM GRANULOCYTES # BLD: 0 X10E3/UL (ref 0–0.1)
IMM GRANULOCYTES NFR BLD: 0 %
LDLC SERPL CALC-MCNC: 102 MG/DL (ref 0–99)
LYMPHOCYTES # BLD AUTO: 2.8 X10E3/UL (ref 0.7–3.1)
LYMPHOCYTES NFR BLD AUTO: 21 %
MCH RBC QN AUTO: 32.4 PG (ref 26.6–33)
MCHC RBC AUTO-ENTMCNC: 33.5 G/DL (ref 31.5–35.7)
MCV RBC AUTO: 97 FL (ref 79–97)
MONOCYTES # BLD AUTO: 0.7 X10E3/UL (ref 0.1–0.9)
MONOCYTES NFR BLD AUTO: 6 %
NEUTROPHILS # BLD AUTO: 9.1 X10E3/UL (ref 1.4–7)
NEUTROPHILS NFR BLD AUTO: 70 %
PLATELET # BLD AUTO: 251 X10E3/UL (ref 150–379)
POTASSIUM SERPL-SCNC: 4.6 MMOL/L (ref 3.5–5.2)
PROT SERPL-MCNC: 7 G/DL (ref 6–8.5)
RBC # BLD AUTO: 4.78 X10E6/UL (ref 4.14–5.8)
SODIUM SERPL-SCNC: 143 MMOL/L (ref 134–144)
TRIGL SERPL-MCNC: 250 MG/DL (ref 0–149)
VLDLC SERPL CALC-MCNC: 50 MG/DL (ref 5–40)
WBC # BLD AUTO: 12.9 X10E3/UL (ref 3.4–10.8)

## 2017-08-18 RX ORDER — BACLOFEN 10 MG/1
10 TABLET ORAL 3 TIMES DAILY
Qty: 90 TABLET | Refills: 0 | Status: SHIPPED | OUTPATIENT
Start: 2017-08-18 | End: 2017-09-25 | Stop reason: SDUPTHER

## 2017-08-18 RX ORDER — TIZANIDINE 4 MG/1
4 TABLET ORAL NIGHTLY PRN
Qty: 30 TABLET | Refills: 0 | Status: SHIPPED | OUTPATIENT
Start: 2017-08-18 | End: 2017-09-25

## 2017-09-25 ENCOUNTER — OFFICE VISIT (OUTPATIENT)
Dept: FAMILY MEDICINE CLINIC | Facility: CLINIC | Age: 47
End: 2017-09-25

## 2017-09-25 VITALS
SYSTOLIC BLOOD PRESSURE: 122 MMHG | BODY MASS INDEX: 35.87 KG/M2 | TEMPERATURE: 98.3 F | WEIGHT: 242.2 LBS | HEART RATE: 95 BPM | DIASTOLIC BLOOD PRESSURE: 74 MMHG | OXYGEN SATURATION: 97 % | RESPIRATION RATE: 18 BRPM | HEIGHT: 69 IN

## 2017-09-25 DIAGNOSIS — Z28.21 PNEUMOCOCCAL VACCINATION DECLINED: ICD-10-CM

## 2017-09-25 DIAGNOSIS — R73.02 GLUCOSE INTOLERANCE (IMPAIRED GLUCOSE TOLERANCE): ICD-10-CM

## 2017-09-25 DIAGNOSIS — E78.2 MIXED HYPERLIPIDEMIA: ICD-10-CM

## 2017-09-25 DIAGNOSIS — G89.29 CHRONIC BILATERAL LOW BACK PAIN, WITH SCIATICA PRESENCE UNSPECIFIED: ICD-10-CM

## 2017-09-25 DIAGNOSIS — F41.9 ANXIETY: Primary | ICD-10-CM

## 2017-09-25 DIAGNOSIS — Z28.21 INFLUENZA VACCINATION DECLINED: ICD-10-CM

## 2017-09-25 DIAGNOSIS — M54.5 CHRONIC BILATERAL LOW BACK PAIN, WITH SCIATICA PRESENCE UNSPECIFIED: ICD-10-CM

## 2017-09-25 DIAGNOSIS — Z51.81 THERAPEUTIC DRUG MONITORING: ICD-10-CM

## 2017-09-25 DIAGNOSIS — F17.210 SMOKES 1 PACK OF CIGARETTES PER DAY: ICD-10-CM

## 2017-09-25 DIAGNOSIS — D72.829 LEUKOCYTOSIS, UNSPECIFIED TYPE: ICD-10-CM

## 2017-09-25 LAB
POC AMPHETAMINES: NEGATIVE
POC BARBITURATES: NEGATIVE
POC BENZODIAZEPHINES: POSITIVE
POC METHADONE: NEGATIVE
POC METHAMPHETAMINE SCREEN URINE: NEGATIVE
POC OPIATES: NEGATIVE
POC OXYCODONE: NEGATIVE
POC PHENCYCLIDINE: NEGATIVE
POC PROPOXYPHENE: NEGATIVE
POC THC: NEGATIVE
POC TRICYCLIC ANTIDEPRESSANTS: NEGATIVE

## 2017-09-25 PROCEDURE — 99214 OFFICE O/P EST MOD 30 MIN: CPT | Performed by: FAMILY MEDICINE

## 2017-09-25 PROCEDURE — 80305 DRUG TEST PRSMV DIR OPT OBS: CPT | Performed by: FAMILY MEDICINE

## 2017-09-25 RX ORDER — BACLOFEN 10 MG/1
10 TABLET ORAL 3 TIMES DAILY
Qty: 90 TABLET | Refills: 2 | Status: SHIPPED | OUTPATIENT
Start: 2017-09-25 | End: 2017-12-20 | Stop reason: SDUPTHER

## 2017-09-25 RX ORDER — ALPRAZOLAM 1 MG/1
1 TABLET ORAL AS NEEDED
Qty: 60 TABLET | Refills: 0 | Status: SHIPPED | OUTPATIENT
Start: 2017-09-25 | End: 2017-12-20 | Stop reason: SDUPTHER

## 2017-09-25 RX ORDER — BACLOFEN 10 MG/1
10 TABLET ORAL 3 TIMES DAILY
Qty: 90 TABLET | Refills: 0 | Status: CANCELLED | OUTPATIENT
Start: 2017-09-25

## 2017-10-10 ENCOUNTER — TELEPHONE (OUTPATIENT)
Dept: FAMILY MEDICINE CLINIC | Facility: CLINIC | Age: 47
End: 2017-10-10

## 2017-10-27 LAB
BASOPHILS # BLD AUTO: 0 X10E3/UL (ref 0–0.2)
BASOPHILS NFR BLD AUTO: 1 %
EOSINOPHIL # BLD AUTO: 0.2 X10E3/UL (ref 0–0.4)
EOSINOPHIL NFR BLD AUTO: 2 %
ERYTHROCYTE [DISTWIDTH] IN BLOOD BY AUTOMATED COUNT: 13 % (ref 12.3–15.4)
HCT VFR BLD AUTO: 45.8 % (ref 37.5–51)
HGB BLD-MCNC: 15.6 G/DL (ref 12.6–17.7)
IMM GRANULOCYTES # BLD: 0 X10E3/UL (ref 0–0.1)
IMM GRANULOCYTES NFR BLD: 0 %
LYMPHOCYTES # BLD AUTO: 2.5 X10E3/UL (ref 0.7–3.1)
LYMPHOCYTES NFR BLD AUTO: 31 %
MCH RBC QN AUTO: 32.8 PG (ref 26.6–33)
MCHC RBC AUTO-ENTMCNC: 34.1 G/DL (ref 31.5–35.7)
MCV RBC AUTO: 96 FL (ref 79–97)
MONOCYTES # BLD AUTO: 0.8 X10E3/UL (ref 0.1–0.9)
MONOCYTES NFR BLD AUTO: 10 %
NEUTROPHILS # BLD AUTO: 4.6 X10E3/UL (ref 1.4–7)
NEUTROPHILS NFR BLD AUTO: 56 %
PLATELET # BLD AUTO: 239 X10E3/UL (ref 150–379)
RBC # BLD AUTO: 4.76 X10E6/UL (ref 4.14–5.8)
WBC # BLD AUTO: 8.1 X10E3/UL (ref 3.4–10.8)

## 2017-12-20 ENCOUNTER — OFFICE VISIT (OUTPATIENT)
Dept: FAMILY MEDICINE CLINIC | Facility: CLINIC | Age: 47
End: 2017-12-20

## 2017-12-20 VITALS
HEIGHT: 69 IN | HEART RATE: 103 BPM | BODY MASS INDEX: 36.17 KG/M2 | SYSTOLIC BLOOD PRESSURE: 130 MMHG | DIASTOLIC BLOOD PRESSURE: 81 MMHG | WEIGHT: 244.2 LBS | RESPIRATION RATE: 18 BRPM | OXYGEN SATURATION: 98 % | TEMPERATURE: 98.7 F

## 2017-12-20 DIAGNOSIS — R73.02 GLUCOSE INTOLERANCE (IMPAIRED GLUCOSE TOLERANCE): ICD-10-CM

## 2017-12-20 DIAGNOSIS — I10 ESSENTIAL HYPERTENSION: ICD-10-CM

## 2017-12-20 DIAGNOSIS — F17.210 SMOKES 1 PACK OF CIGARETTES PER DAY: ICD-10-CM

## 2017-12-20 DIAGNOSIS — F41.9 ANXIETY: Primary | ICD-10-CM

## 2017-12-20 DIAGNOSIS — G89.29 CHRONIC BILATERAL LOW BACK PAIN, WITH SCIATICA PRESENCE UNSPECIFIED: ICD-10-CM

## 2017-12-20 DIAGNOSIS — E55.9 HYPOVITAMINOSIS D: ICD-10-CM

## 2017-12-20 DIAGNOSIS — M54.5 CHRONIC BILATERAL LOW BACK PAIN, WITH SCIATICA PRESENCE UNSPECIFIED: ICD-10-CM

## 2017-12-20 DIAGNOSIS — E78.2 MIXED HYPERLIPIDEMIA: ICD-10-CM

## 2017-12-20 PROCEDURE — 99214 OFFICE O/P EST MOD 30 MIN: CPT | Performed by: FAMILY MEDICINE

## 2017-12-20 RX ORDER — ALPRAZOLAM 1 MG/1
1 TABLET ORAL AS NEEDED
Qty: 60 TABLET | Refills: 0 | Status: SHIPPED | OUTPATIENT
Start: 2017-12-20

## 2017-12-20 RX ORDER — BACLOFEN 10 MG/1
10 TABLET ORAL 3 TIMES DAILY
Qty: 90 TABLET | Refills: 2 | Status: ON HOLD | OUTPATIENT
Start: 2017-12-20 | End: 2021-05-21

## 2017-12-20 NOTE — PROGRESS NOTES
Chief Complaint   Patient presents with   • Hyperglycemia     patient here for follow up no problems reported   • Anxiety     patient here for refills today with no complaints        History:  Madhu Ochoa is a 47 y.o. male presents who today for evaluation of the above problems.  PCP currently listed as Roberto Castillo MD.   Glucose intolerance: The patient has not been checking regularly.  He got  this past Saturday to his .  He did check sugars saturday 137 after eating all day.  Highest A1c 5.9.  Weight is up to 244 but this is still down ~10 lb from 12/2016.  He has not been taking any medications, not watching weight much.   BP is Stable and at goal of <140/90.  He has history of DM on zestoretic and stable.  Hyperlipidemia mixed tricor and mevacor.  He has chronic low back pain, still off pain meds.  No longer following with pain management.  Voltaren gel not used lately.  Using baclofen as needed for back pain. Chronic anxiety using xanax has 10 1/2 pills left in the bottle. He was given #60 at last OV 09/25/17.  I will provide another 60 as this is reasonable for about a 90 day supply.  No issues, no side effects from medicatoins. Due again for labs today.  He has not flu shot.  He does not want this as <10% effective. HTN stable, Lipid stable, hypertriglycerides stable but still elevated. Tolerating tyricor and statin.      Madhu Ochoa  has a past medical history of Acid reflux; Allergic; Anxiety; Asthma; Hyperglycemia; Hyperlipidemia; Hypertension; Low back pain; Obesity; Osteopenia; and Scoliosis.    Allergies   Allergen Reactions   • Naprosyn [Naproxen] GI Intolerance     Past Medical History:   Diagnosis Date   • Acid reflux    • Allergic    • Anxiety    • Asthma    • Hyperglycemia    • Hyperlipidemia    • Hypertension    • Low back pain    • Obesity    • Osteopenia    • Scoliosis      Past Surgical History:   Procedure Laterality Date   • HERNIA REPAIR       Family History    Problem Relation Age of Onset   • Hypertension Mother    • Diabetes Mother    • Hypertension Father    • Diabetes Father    • Cancer Maternal Grandmother      colon cancer   • Leukemia Maternal Grandfather    • No Known Problems Paternal Grandmother    • No Known Problems Paternal Grandfather        Current Outpatient Prescriptions on File Prior to Visit   Medication Sig Dispense Refill   • Albuterol Sulfate (VENTOLIN HFA IN) Inhale As Needed.     • ALPRAZolam (XANAX) 1 MG tablet Take 1 tablet by mouth As Needed for Anxiety. 60 tablet 0   • baclofen (LIORESAL) 10 MG tablet Take 1 tablet by mouth 3 (Three) Times a Day. 90 tablet 2   • Cholecalciferol (VITAMIN D3) 5000 UNITS capsule capsule Take 5,000 Units by mouth Daily.     • fenofibrate (TRICOR) 145 MG tablet Take 1 tablet by mouth Daily. 90 tablet 2   • lisinopril-hydrochlorothiazide (PRINZIDE,ZESTORETIC) 20-12.5 MG per tablet Take 1 tablet by mouth Daily. 90 tablet 2   • lovastatin (MEVACOR) 20 MG tablet Take 1 tablet by mouth Every Night. 90 tablet 2   • Omega-3 Fatty Acids (OMEGA-3 FISH OIL PO) Take  by mouth Daily.     • Omeprazole Magnesium (PRILOSEC OTC PO) Take  by mouth Daily.     • diclofenac (VOLTAREN) 1 % gel gel Apply 4 g topically Daily.       No current facility-administered medications on file prior to visit.        Family history, surgical history, past medical history, Allergies and meds reviewed with patient today and updated in [facility] EMR.     ROS:  Review of Systems   Constitutional: Negative for activity change, appetite change, chills, diaphoresis, fatigue and fever.        Obesity chronic mildly worse   HENT: Negative for congestion, ear discharge, ear pain, facial swelling, hearing loss, rhinorrhea, sinus pressure, sneezing, sore throat and tinnitus.    Eyes: Negative for pain, discharge, redness and visual disturbance.   Respiratory: Negative for apnea, cough, choking, chest tightness, shortness of breath and wheezing.    Cardiovascular:  "Negative for chest pain, palpitations and leg swelling.        Htn stable   Gastrointestinal: Negative for abdominal pain, constipation, diarrhea, nausea and vomiting.   Endocrine:        Glucose intolerance stable.   Genitourinary: Negative for difficulty urinating, flank pain, frequency, penile pain and urgency.   Musculoskeletal: Positive for back pain (stable off opiates). Negative for arthralgias, gait problem, joint swelling, myalgias and neck pain.   Skin: Negative for color change, pallor and rash.   Allergic/Immunologic: Negative for environmental allergies and food allergies.   Neurological: Negative for dizziness, seizures, weakness, numbness and headaches.   Hematological: Negative for adenopathy. Does not bruise/bleed easily.   Psychiatric/Behavioral: Negative for agitation, behavioral problems, confusion, hallucinations, self-injury, sleep disturbance and suicidal ideas. The patient is nervous/anxious (stable on meds).        OBJECTIVE:  Vitals:    12/20/17 1556   BP: 130/81   BP Location: Right arm   Patient Position: Sitting   Cuff Size: Large Adult   Pulse: 103   Resp: 18   Temp: 98.7 °F (37.1 °C)   TempSrc: Oral   SpO2: 98%   Weight: 111 kg (244 lb 3.2 oz)   Height: 175.3 cm (69\")     Physical Exam   Constitutional: He is oriented to person, place, and time. He appears well-developed and well-nourished.   HENT:   Head: Normocephalic and atraumatic.   Right Ear: External ear normal.   Left Ear: External ear normal.   Nose: Nose normal.   Mouth/Throat: Oropharynx is clear and moist.   Head- normocephalic and atraumatic.  Neck- without visible/palpable lumps or pulsations.  Palpation- No bony tenderness about head/neck along frontal, occipital, temporal, parietal, mastoid, jawline, zygoma, orbit or any other location.  NO temporal artery tenderness. No TMJ tenderness. Neck Supple.  Thyroid-No thyromegaly, no nodules     Eyes: Conjunctivae and EOM are normal. Pupils are equal, round, and reactive to " light.   Neck: Normal range of motion. Neck supple. No thyromegaly present.   Cardiovascular: Normal rate, regular rhythm, normal heart sounds and intact distal pulses.    No murmur heard.  Pulmonary/Chest: Effort normal and breath sounds normal. No respiratory distress. He has no wheezes. He exhibits no tenderness.   Abdominal: Soft. Bowel sounds are normal. There is no tenderness. There is no rebound and no guarding.   Musculoskeletal:        Right hip: He exhibits normal range of motion, normal strength and no tenderness.        Left hip: He exhibits normal range of motion, normal strength and no tenderness.        Thoracic back: He exhibits decreased range of motion, tenderness, pain and spasm. He exhibits no bony tenderness.        Lumbar back: He exhibits decreased range of motion, tenderness, pain and spasm. He exhibits no bony tenderness.   Straight leg normal bilaterally both sitting and supine.  patellar and achilles reflexes normal.  No ankle clonus,  SONA negative, FADIR negative.  Straight leg raise negative.     Madhu had a diabetic foot exam performed today.   During the foot exam he had a monofilament test performed.    Neurological Sensory Findings - Unaltered hot/cold right ankle/foot discrimination and unaltered hot/cold left ankle/foot discrimination. Unaltered sharp/dull right ankle/foot discrimination and unaltered sharp/dull left ankle/foot discrimination. No right ankle/foot altered proprioception and no left ankle/foot altered proprioception   Skin Integrity  -  His right foot skin is intact.  He has callous right foot and right heel is dry and cracked.  He has no right foot onychomycosis, no right foot ulcer,  no ingrown toenail on right foot and no right foot warmth.    Madhu 's left foot skin is intact. He has callous left foot and left heel dry and cracked. He has no left foot onychomycosis, no left foot ulcer, no left ingrown toenail and no left foot warmth..  Lymphadenopathy:     He has  no cervical adenopathy.   Neurological: He is alert and oriented to person, place, and time. No cranial nerve deficit. Coordination normal.   Skin: Skin is warm and dry. No rash noted.   Psychiatric: He has a normal mood and affect. His behavior is normal. Judgment and thought content normal.   Nursing note and vitals reviewed.      Assessment/Plan:  Anxiety: Chronic established problem. Current Status:stable.  We reviewed current therapy for this problem.  Discussed R/B/A to current therapy. Discussed need to follow recommendations and to use medications regularly as prescribed, to avoid missing doses as able.  Medications reviewed with patient today. We discussed cessation/continuation of medications for current problem.  If refills needed Rx refills will be provided.  No side effects from medications reported.  Risks/benefits to current therapy discussed. The patient has read and signed the [facility] Controlled Substance Contract.  I will continue to see patient for regular follow up appointments.  They are well controlled on their medication.  LILIANA is updated every 3 months. The patient is aware of the potential for addiction and dependence. Pill count today.  UDS regularly.  Using Rx as recommended.   · Orders as listed below  -     ALPRAZolam (XANAX) 1 MG tablet; Take 1 tablet by mouth As Needed for Anxiety.    Smokes 1 pack of cigarettes per day: Tobacco abuse: Tobacco Cessation discussed today for 2 minutes.  We reviewed lifestyle choices and discussed quitting.  Ready to quit: yes. The risks and hazards of continued tobacco abuse were discussed with the patient today and total tobacco cessation as recommended. It was clearly and unambiguously explained that continued tobacco usage will adversely affect overall morbidity and mortality of the patient.  Patient was informed that tobacco use can lead to numerous cancers, worsening of cardiovascular and pulmonary systems and that lung damage is often  permanent and irreversible.   · I advised the patient to inform me if any further assistance is requested, as we can offer counseling services, nicotine replacement inhaled, patch, lozenge, gum, or prescription medications to include Chantix or Wellbutrin for assistance.    · I will reassess the interest in tobacco cessation at the next and all subsequent visits.    Chronic bilateral low back pain, with sciatica presence unspecified: Stable at present, using baclofen 1-3x daily and this is helping/stable.   -     baclofen (LIORESAL) 10 MG tablet; Take 1 tablet by mouth 3 (Three) Times a Day.    Hypovitaminosis D: Chronic problem. He has actually been somewhat elevated and I am concerned that he may get toxic if using too much vit D.  Would recommend backing down to 1000 units-2000 units daily from the 5000 units daily.  25 Hydroxy, Vitamin D   Date Value Ref Range Status   12/21/2017 53.1 30.0 - 100.0 ng/mL Final   -     Comprehensive Metabolic Panel  -     Vitamin D 25 Hydroxy    Essential hypertension: HTN: Suspect essential HTN. Good BP control is encouraged with Goal BP based on JNC 8 guidelines:  For the general population <60 yr old goal BP <140/90 and for those >60 <150/90.  For patients of all ages with Diabetes, CKD, Known CAD the goal is <140/90.  At goal yes.  If not already owned, I recommended to the patient to obtain electronic home BP machine with upper arm blood pressure cuff and to check regularly as instructed.  Keep BP log and bring to subsequent visits.    · Offered handout on HTN educational topics: HTN, Low sodium diet.   These were provided if patient requested these today.  · ADA diet encouraged.  · Monitor cholesterol regularly  · Monitor weight with goal of BMI <30.  · Consider taking Rx at night as recent study supports this may decreased chances of leading to DM.   · Consider bringing home BP cuff to office to compare readings with ours.  · Checking BP at home can help to lower BP.   Certain medications and substances can increase BP to include: NSAIDS, caffeine, decongestants, nicotine.    · Well controlled, BP goal for age is <140/90 per JNC 8 guidelines and continue current medications    -     Comprehensive Metabolic Panel    Mixed hyperlipidemia: Total cholesterol stable, HDL is low and LDL is stable.  He is on tricor for his trigly  Lab Results   Component Value Date    CHLPL 175 12/21/2017    TRIG 336 (H) 12/21/2017    HDL 26 (L) 12/21/2017    LDL 82 12/21/2017   -     Lipid Panel    Glucose intolerance (impaired glucose tolerance): will continue to monitor and treat as if DM.  Recommend annual eye evaluation.    Lab Results   Component Value Date    HGBA1C 5.50 12/21/2017   -     Hemoglobin A1c    Other orders  -     Nursing communication        Risks/benefits of current and new medications discussed with the patient and or family today.  The patient/family are aware and accept that if there any side effects they should call or return to clinic as soon as possible.  Appropriate F/U discussed for topics addressed today. All questions were answered to the satisfactory state of patient/family.  Should symptoms fail to improve or worsen they agree to call or return to clinic or to go to the ER. Education handouts were offered on any new Rx if requested.  Discussed the importance of following up with any needed screening tests/labs/specialist appointments and any requested follow-up recommended by me today.  Importance of maintaining follow-up discussed and patient accepts that missed appointments can delay diagnosis and potentially lead to worsening of conditions.    An After Visit Summary was printed and given to the patient at discharge.  Follow-up: Return in about 3 months (around 3/20/2018).         Roberto Castillo M.D. 12/20/2017

## 2017-12-22 LAB
25(OH)D3+25(OH)D2 SERPL-MCNC: 53.1 NG/ML (ref 30–100)
ALBUMIN SERPL-MCNC: 4.7 G/DL (ref 3.5–5)
ALBUMIN/GLOB SERPL: 1.7 G/DL (ref 1.1–2.5)
ALP SERPL-CCNC: 69 U/L (ref 24–120)
ALT SERPL-CCNC: 63 U/L (ref 0–54)
AST SERPL-CCNC: 34 U/L (ref 7–45)
BILIRUB SERPL-MCNC: 0.4 MG/DL (ref 0.1–1)
BUN SERPL-MCNC: 21 MG/DL (ref 5–21)
BUN/CREAT SERPL: 18.4 (ref 7–25)
CALCIUM SERPL-MCNC: 10.4 MG/DL (ref 8.4–10.4)
CHLORIDE SERPL-SCNC: 102 MMOL/L (ref 98–110)
CHOLEST SERPL-MCNC: 175 MG/DL (ref 130–200)
CO2 SERPL-SCNC: 25 MMOL/L (ref 24–31)
CREAT SERPL-MCNC: 1.14 MG/DL (ref 0.5–1.4)
GLOBULIN SER CALC-MCNC: 2.8 GM/DL
GLUCOSE SERPL-MCNC: 81 MG/DL (ref 70–100)
HBA1C MFR BLD: 5.5 %
HDLC SERPL-MCNC: 26 MG/DL
LDLC SERPL CALC-MCNC: 82 MG/DL (ref 0–99)
POTASSIUM SERPL-SCNC: 4.5 MMOL/L (ref 3.5–5.3)
PROT SERPL-MCNC: 7.5 G/DL (ref 6.3–8.7)
SODIUM SERPL-SCNC: 142 MMOL/L (ref 135–145)
TRIGL SERPL-MCNC: 336 MG/DL (ref 0–149)
VLDLC SERPL CALC-MCNC: 67.2 MG/DL

## 2021-04-26 ENCOUNTER — TELEPHONE (OUTPATIENT)
Dept: GASTROENTEROLOGY | Facility: CLINIC | Age: 51
End: 2021-04-26

## 2021-04-26 NOTE — TELEPHONE ENCOUNTER
Roberto Castillo MD    Patient records from physician reviewed  According to medical record Madhu Ochoa does not have a history of heart disease or lung disease.  According to medical record Madhu Ochoa is not currently on blood thinner.  According to medical record Madhu Ochoa is not currently exhibiting abdominal pain, weight loss, hematochezia, melena,  change in bowels, or other symptoms to indicate pt would need to be seen in office.    Will submit order for Madhu Ochoa to proceed with [facility]    Medication will be verified as well as a lack of GI related symptomology when pt is scheduled for procedure.

## 2021-04-28 NOTE — TELEPHONE ENCOUNTER
Spoke with patient - wants to keep office appointment with  due to this is his first procedure and he has lots of questions and concerns. We did schedule his colonoscopy procedure due to his work schedule - saved a spot for 05/21/2021 at 9:15am.     Thank you

## 2021-05-05 NOTE — PROGRESS NOTES
Chief Complaint   Patient presents with   • Colonoscopy     screening colon       PCP: Roberto Castillo MD  REFER: No ref. provider found    Subjective     HPI    VIDEO VISIT    Madhu Ochoa is a 50 y.o. male who presents to office for preventative maintenance.  There is not  a personal history of colon polyps.  There is not a history of colon cancer.  He does not have complaints of nausea/vomiting, change in bowels, weight loss, no BRBPR, no melena.  There is not a family history of colon cancer in a first degree relative.  Positive family history of colon cancer in maternal grandmother    There is a family history of colon polyps-mother.  He last colonoscopy-no previous colonoscopy .  Bowels do move on regular basis.      Past Medical History:   Diagnosis Date   • Acid reflux    • Allergic    • Anxiety    • Asthma    • Hyperglycemia    • Hyperlipidemia    • Hypertension    • Low back pain    • Obesity    • Osteopenia    • Scoliosis      Outpatient Medications Marked as Taking for the 5/7/21 encounter (Telemedicine) with Dre Duarte APRN   Medication Sig Dispense Refill   • Albuterol Sulfate (VENTOLIN HFA IN) Inhale As Needed.     • ALPRAZolam (XANAX) 1 MG tablet Take 1 tablet by mouth As Needed for Anxiety. 60 tablet 0   • Cholecalciferol (VITAMIN D3) 5000 UNITS capsule capsule Take 5,000 Units by mouth Daily.     • fenofibrate (TRICOR) 145 MG tablet Take 1 tablet by mouth Daily. 90 tablet 2   • lisinopril-hydrochlorothiazide (PRINZIDE,ZESTORETIC) 20-12.5 MG per tablet Take 1 tablet by mouth Daily. 90 tablet 2   • Omeprazole Magnesium (PRILOSEC OTC PO) Take  by mouth Daily.     • rosuvastatin (CRESTOR) 20 MG tablet Take 20 mg by mouth Daily.       Allergies   Allergen Reactions   • Naprosyn [Naproxen] GI Intolerance     Social History     Socioeconomic History   • Marital status:      Spouse name: Not on file   • Number of children: Not on file   • Years of education: Not on file   • Highest  education level: Not on file   Tobacco Use   • Smoking status: Current Every Day Smoker     Packs/day: 1.00     Years: 20.00     Pack years: 20.00     Types: Cigarettes   • Smokeless tobacco: Never Used   Substance and Sexual Activity   • Alcohol use: Yes     Comment: occ   • Drug use: No   • Sexual activity: Yes     Partners: Male     Review of Systems   Constitutional: Negative for unexpected weight change.   Respiratory: Negative for shortness of breath.    Cardiovascular: Negative for chest pain.   Gastrointestinal: Negative for abdominal pain and anal bleeding.     Objective   There were no vitals filed for this visit.  Virtual Visit Physical Exam   Physical Exam   Constitutional: appears well-nourished.   HENT:   Head: Atraumatic.   Nose: Nose normal.   Eyes: EOM are normal. Right eye exhibits no discharge. Left eye exhibits no discharge.   Neck: Neck normal appearance.  Pulmonary/Chest: Effort normal.   Abdominal: Abdomen appears normal.   Musculoskeletal: Normal range of motion.   Neurological:alert.   Skin: Skin is dry.   Psychiatric:  normal mood and affect.       Imaging Results (Most Recent)     None        There is no height or weight on file to calculate BMI.    Assessment/Plan   Diagnoses and all orders for this visit:    1. Family history of colonic polyps (Primary)  Comments:  mother    Other orders  -     Clenpiq 10-3.5-12 MG-GM -GM/160ML solution; Take 1 each by mouth Take As Directed.  Dispense: 320 mL; Refill: 0      * Surgery not found *      Advised pt to stop use of NSAIDs, Fish Oil, and MV 5 days prior to procedure, per Dr Jackson protocol.  Tylenol based products are ok to take.  Pt verbalized understanding.     Precautions are currently being put in place due to COVID-19.  I have explained to Madhu Ochoa they will be required to undergo COVID testing prior to their procedure.  Madhu Ochoa verbalized understanding and was willing to proceed.    All risks, benefits, alternatives, and  indications of colonoscopy procedure have been discussed with the patient. Risks to include perforation of the colon requiring possible surgery or colostomy, risk of bleeding from biopsies or removal of colon tissue, possibility of missing a colon polyp or cancer, or adverse drug reaction.  Benefits to include the diagnosis and management of disease of the colon and rectum. Alternatives to include barium enema, radiographic evaluation, lab testing or no intervention. He verbalizes understanding and agrees.     This was an audio and video enabled telemedicine encounter. This visit was conducted with me in my office and Madhu Ochoa at their place of employment      Dre Duarte, APRN  05/07/21        There are no Patient Instructions on file for this visit.

## 2021-05-07 ENCOUNTER — TELEMEDICINE (OUTPATIENT)
Dept: GASTROENTEROLOGY | Facility: CLINIC | Age: 51
End: 2021-05-07

## 2021-05-07 DIAGNOSIS — Z83.71 FAMILY HISTORY OF COLONIC POLYPS: Primary | ICD-10-CM

## 2021-05-07 PROCEDURE — 99441 PR PHYS/QHP TELEPHONE EVALUATION 5-10 MIN: CPT | Performed by: NURSE PRACTITIONER

## 2021-05-07 RX ORDER — SODIUM PICOSULFATE, MAGNESIUM OXIDE, AND ANHYDROUS CITRIC ACID 10; 3.5; 12 MG/160ML; G/160ML; G/160ML
1 LIQUID ORAL TAKE AS DIRECTED
Qty: 320 ML | Refills: 0 | Status: ON HOLD | OUTPATIENT
Start: 2021-05-07 | End: 2021-05-21

## 2021-05-07 RX ORDER — ROSUVASTATIN CALCIUM 20 MG/1
20 TABLET, COATED ORAL DAILY
COMMUNITY

## 2021-05-21 ENCOUNTER — ANESTHESIA EVENT (OUTPATIENT)
Dept: GASTROENTEROLOGY | Facility: HOSPITAL | Age: 51
End: 2021-05-21

## 2021-05-21 ENCOUNTER — ANESTHESIA (OUTPATIENT)
Dept: GASTROENTEROLOGY | Facility: HOSPITAL | Age: 51
End: 2021-05-21

## 2021-05-21 ENCOUNTER — HOSPITAL ENCOUNTER (OUTPATIENT)
Facility: HOSPITAL | Age: 51
Setting detail: HOSPITAL OUTPATIENT SURGERY
Discharge: HOME | End: 2021-05-21
Attending: INTERNAL MEDICINE | Admitting: INTERNAL MEDICINE

## 2021-05-21 VITALS
SYSTOLIC BLOOD PRESSURE: 109 MMHG | HEART RATE: 69 BPM | OXYGEN SATURATION: 99 % | DIASTOLIC BLOOD PRESSURE: 68 MMHG | TEMPERATURE: 97.3 F | WEIGHT: 224 LBS | HEIGHT: 69 IN | RESPIRATION RATE: 11 BRPM | BODY MASS INDEX: 33.18 KG/M2

## 2021-05-21 DIAGNOSIS — Z83.71 FAMILY HISTORY OF COLONIC POLYPS: ICD-10-CM

## 2021-05-21 PROCEDURE — 45385 COLONOSCOPY W/LESION REMOVAL: CPT | Performed by: INTERNAL MEDICINE

## 2021-05-21 PROCEDURE — 88305 TISSUE EXAM BY PATHOLOGIST: CPT | Performed by: INTERNAL MEDICINE

## 2021-05-21 PROCEDURE — 25010000002 PROPOFOL 10 MG/ML EMULSION: Performed by: NURSE ANESTHETIST, CERTIFIED REGISTERED

## 2021-05-21 RX ORDER — PROPOFOL 10 MG/ML
VIAL (ML) INTRAVENOUS AS NEEDED
Status: DISCONTINUED | OUTPATIENT
Start: 2021-05-21 | End: 2021-05-21 | Stop reason: SURG

## 2021-05-21 RX ORDER — LIDOCAINE HYDROCHLORIDE 20 MG/ML
INJECTION, SOLUTION EPIDURAL; INFILTRATION; INTRACAUDAL; PERINEURAL AS NEEDED
Status: DISCONTINUED | OUTPATIENT
Start: 2021-05-21 | End: 2021-05-21 | Stop reason: SURG

## 2021-05-21 RX ORDER — SODIUM CHLORIDE 0.9 % (FLUSH) 0.9 %
10 SYRINGE (ML) INJECTION AS NEEDED
Status: DISCONTINUED | OUTPATIENT
Start: 2021-05-21 | End: 2021-05-21 | Stop reason: HOSPADM

## 2021-05-21 RX ORDER — SODIUM CHLORIDE 9 MG/ML
500 INJECTION, SOLUTION INTRAVENOUS CONTINUOUS PRN
Status: DISCONTINUED | OUTPATIENT
Start: 2021-05-21 | End: 2021-05-21 | Stop reason: HOSPADM

## 2021-05-21 RX ADMIN — SODIUM CHLORIDE 500 ML: 9 INJECTION, SOLUTION INTRAVENOUS at 08:56

## 2021-05-21 RX ADMIN — PROPOFOL 300 MG: 10 INJECTION, EMULSION INTRAVENOUS at 09:54

## 2021-05-21 RX ADMIN — LIDOCAINE HYDROCHLORIDE 100 MG: 20 INJECTION, SOLUTION EPIDURAL; INFILTRATION; INTRACAUDAL; PERINEURAL at 09:54

## 2021-05-21 NOTE — ANESTHESIA POSTPROCEDURE EVALUATION
Patient: Madhu Ochoa    Procedure Summary     Date: 05/21/21 Room / Location: [facility] ENDOSCOPY 2 / [facility] END[facility]    Anesthesia Start: 0950 Anesthesia Stop: 1014    Procedure: COLONOSCOPY WITH ANESTHESIA (N/A ) Diagnosis:       Family history of colonic polyps      (Family history of colonic polyps [Z83.71])    Surgeons: Fernando Jackson DO Provider: Sheela Jeffery CRNA    Anesthesia Type: MAC ASA Status: 2          Anesthesia Type: MAC    Vitals  Vitals Value Taken Time   /72 05/21/21 1020   Temp     Pulse 79 05/21/21 1022   Resp 13 05/21/21 1020   SpO2 99 % 05/21/21 1022   Vitals shown include unvalidated device data.        Post Anesthesia Care and Evaluation    Patient location during evaluation: PHASE II  Patient participation: complete - patient participated  Level of consciousness: awake and alert  Pain management: adequate  Airway patency: patent  Anesthetic complications: No anesthetic complications  PONV Status: none  Cardiovascular status: acceptable  Respiratory status: acceptable  Hydration status: acceptable

## 2021-05-21 NOTE — ANESTHESIA PREPROCEDURE EVALUATION
Anesthesia Evaluation     no history of anesthetic complications:  NPO Solid Status: > 8 hours  NPO Liquid Status: > 2 hours           Airway   Mallampati: II  TM distance: >3 FB  Neck ROM: full  No difficulty expected  Dental      Pulmonary    (+) a smoker Current, asthma,  Cardiovascular   Exercise tolerance: good (4-7 METS)    (+) hypertension, hyperlipidemia,   (-) CAD, cardiac stents      Neuro/Psych  (+) psychiatric history Anxiety,     (-) seizures, TIA, CVA  GI/Hepatic/Renal/Endo    (+) obesity,  GERD,  diabetes mellitus (pre diabetes),   (-) liver disease, no renal disease    Musculoskeletal     Abdominal    Substance History      OB/GYN          Other                        Anesthesia Plan    ASA 2     MAC     intravenous induction     Anesthetic plan, all risks, benefits, and alternatives have been provided, discussed and informed consent has been obtained with: patient.

## 2021-05-25 ENCOUNTER — TELEPHONE (OUTPATIENT)
Dept: GASTROENTEROLOGY | Facility: CLINIC | Age: 51
End: 2021-05-25

## 2021-05-26 LAB
CYTO UR: NORMAL
LAB AP CASE REPORT: NORMAL
PATH REPORT.FINAL DX SPEC: NORMAL
PATH REPORT.GROSS SPEC: NORMAL

## 2021-06-04 ENCOUNTER — TELEPHONE (OUTPATIENT)
Dept: GASTROENTEROLOGY | Facility: CLINIC | Age: 51
End: 2021-06-04

## 2021-06-04 NOTE — TELEPHONE ENCOUNTER
----- Message from Madhu Ochoa sent at 6/4/2021  2:54 PM CDT -----  Regarding: Test Results Question  Contact: [phone]  Last week I received and reviewed the pathology report from my colonoscopy on May 21 via this [employer] patient portal.  Since the report showed no evidence of malignancy on any of the polyps tested, I am assuming you still want to repeat in 2 years. If I need to know anything else, please let me know. Thank you.

## 2023-04-13 RX ORDER — SODIUM PICOSULFATE, MAGNESIUM OXIDE, AND ANHYDROUS CITRIC ACID 10; 3.5; 12 MG/160ML; G/160ML; G/160ML
1 LIQUID ORAL TAKE AS DIRECTED
Qty: 320 ML | Refills: 0 | Status: CANCELLED | OUTPATIENT
Start: 2023-04-13

## 2023-04-13 NOTE — PROGRESS NOTES
Chief Complaint   Patient presents with   • Colonoscopy       PCP: Roberto Castillo MD  REFER: No ref. provider found    Subjective     HPI    VIDEO VISIT    Madhu Ochoa is a 52 y.o. male whom has appointment  for preventative maintenance.  There is  a personal history of colon polyps.  There is not a history of colon cancer.  He does not have complaints of nausea/vomiting, change in bowels, weight loss, no BRBPR, no melena.  There is not a family history of colon cancer in first degree relative.  Positive history of colon cancer in maternal grandmother.   There is not a family history of colon polyps in first degree relative.  Madhu Ochoa last colonoscopy-2021 .  Bowels do move on regular basis.    COLONOSCOPY (05/21/2021 09:47)  Tissue Pathology Exam (05/21/2021 09:58)-tubular adenoma - 2 years     No results for input(s): GLUCOSE, NA, K, CREATININE, BUN, BCR, ALKPHOS, ALT, AST, BILITOT, ALBUMIN in the last 90518 hours.   No results for input(s): EGFRIFNONA, EGFRIFAFRI, EGFRRESULT in the last 82230 hours.    Past Medical History:   Diagnosis Date   • Acid reflux    • Allergic    • Anxiety    • Asthma    • Hyperglycemia    • Hyperlipidemia    • Hypertension    • Low back pain    • Obesity    • Osteopenia    • Scoliosis      Outpatient Medications Marked as Taking for the 4/14/23 encounter (Telemedicine) with Dre Duarte APRN   Medication Sig Dispense Refill   • Albuterol Sulfate (VENTOLIN HFA IN) Inhale As Needed.     • ALPRAZolam (XANAX) 1 MG tablet Take 1 tablet by mouth As Needed for Anxiety. 60 tablet 0   • Cholecalciferol (VITAMIN D3) 5000 UNITS capsule capsule Take 1 capsule by mouth Daily.     • fenofibrate (TRICOR) 145 MG tablet Take 1 tablet by mouth Daily. 90 tablet 2   • lisinopril-hydrochlorothiazide (PRINZIDE,ZESTORETIC) 20-12.5 MG per tablet Take 1 tablet by mouth Daily. 90 tablet 2   • Omeprazole Magnesium (PRILOSEC OTC PO) Take  by mouth Daily.     • rosuvastatin (CRESTOR) 20 MG  tablet Take 1 tablet by mouth Daily.     • sertraline (ZOLOFT) 50 MG tablet Take 1 tablet by mouth Daily.       Allergies   Allergen Reactions   • Naprosyn [Naproxen] GI Intolerance     Social History     Socioeconomic History   • Marital status:    Tobacco Use   • Smoking status: Every Day     Packs/day: 1.00     Years: 20.00     Pack years: 20.00     Types: Cigarettes   • Smokeless tobacco: Never   Vaping Use   • Vaping Use: Never used   Substance and Sexual Activity   • Alcohol use: Yes     Comment: occ   • Drug use: No   • Sexual activity: Yes     Partners: Male     Review of Systems   Constitutional: Negative for unexpected weight change.   Respiratory: Negative for shortness of breath.    Cardiovascular: Negative for chest pain.   Gastrointestinal: Negative for abdominal pain and anal bleeding.     Objective   There were no vitals filed for this visit.    Virtual Visit Physical Exam     Physical Exam   Constitutional: appears well-nourished.   HENT:   Head: Atraumatic.   Nose: Nose normal.   Eyes: EOM are normal. Right eye exhibits no discharge. Left eye exhibits no discharge.   Neck: Neck normal appearance.  Pulmonary/Chest: Effort normal.   Abdominal: Abdomen appears normal.   Musculoskeletal: Normal range of motion.   Neurological:alert.   Skin: Skin is dry.   Psychiatric:  normal mood and affect.         Imaging Results (Most Recent)     None        There is no height or weight on file to calculate BMI.    Assessment & Plan     Diagnoses and all orders for this visit:    1. History of adenomatous polyp of colon (Primary)  -     Case Request; Standing  -     Case Request    2. Family history of colonic polyps    Other orders  -     Implement Anesthesia Orders Day of Procedure; Standing  -     Obtain Informed Consent; Standing        COLONOSCOPY WITH ANESTHESIA (N/A)      Advised pt to stop use of NSAIDs, Fish Oil, and MV 5 days prior to procedure, per Dr Jackson protocol.  Tylenol based products are  ok to take.  Pt verbalized understanding.       All risks, benefits, alternatives, and indications of colonoscopy procedure have been discussed with the patient. Risks to include perforation of the colon requiring possible surgery or colostomy, risk of bleeding from biopsies or removal of colon tissue, possibility of missing a colon polyp or cancer, or adverse drug reaction.  Benefits to include the diagnosis and management of disease of the colon and rectum. Alternatives to include barium enema, radiographic evaluation, lab testing or no intervention. He verbalizes understanding and agrees.     This was an audio and video enabled telemedicine encounter. This visit was conducted with me in my office and Madhu Ochoa at their place of employment   This visit included audio and video interaction.  No technical issues occurred during this visit.       Dre Duarte, APRN  04/14/23        There are no Patient Instructions on file for this visit.

## 2023-04-14 ENCOUNTER — TELEMEDICINE (OUTPATIENT)
Dept: GASTROENTEROLOGY | Facility: CLINIC | Age: 53
End: 2023-04-14
Payer: COMMERCIAL

## 2023-04-14 DIAGNOSIS — Z83.71 FAMILY HISTORY OF COLONIC POLYPS: ICD-10-CM

## 2023-04-14 DIAGNOSIS — Z86.010 HISTORY OF ADENOMATOUS POLYP OF COLON: Primary | ICD-10-CM

## 2023-04-18 PROBLEM — Z86.010 HISTORY OF ADENOMATOUS POLYP OF COLON: Status: ACTIVE | Noted: 2023-04-18

## 2023-06-02 ENCOUNTER — HOSPITAL ENCOUNTER (OUTPATIENT)
Facility: HOSPITAL | Age: 53
Setting detail: HOSPITAL OUTPATIENT SURGERY
Discharge: HOME | End: 2023-06-02
Attending: INTERNAL MEDICINE | Admitting: INTERNAL MEDICINE
Payer: COMMERCIAL

## 2023-06-02 ENCOUNTER — ANESTHESIA EVENT (OUTPATIENT)
Dept: GASTROENTEROLOGY | Facility: HOSPITAL | Age: 53
End: 2023-06-02
Payer: COMMERCIAL

## 2023-06-02 ENCOUNTER — ANESTHESIA (OUTPATIENT)
Dept: GASTROENTEROLOGY | Facility: HOSPITAL | Age: 53
End: 2023-06-02
Payer: COMMERCIAL

## 2023-06-02 VITALS
WEIGHT: 209 LBS | RESPIRATION RATE: 17 BRPM | HEIGHT: 69 IN | BODY MASS INDEX: 30.96 KG/M2 | TEMPERATURE: 97 F | SYSTOLIC BLOOD PRESSURE: 116 MMHG | OXYGEN SATURATION: 96 % | DIASTOLIC BLOOD PRESSURE: 71 MMHG | HEART RATE: 64 BPM

## 2023-06-02 DIAGNOSIS — Z86.010 HISTORY OF ADENOMATOUS POLYP OF COLON: ICD-10-CM

## 2023-06-02 PROCEDURE — 45385 COLONOSCOPY W/LESION REMOVAL: CPT | Performed by: INTERNAL MEDICINE

## 2023-06-02 PROCEDURE — 25010000002 PROPOFOL 10 MG/ML EMULSION: Performed by: NURSE ANESTHETIST, CERTIFIED REGISTERED

## 2023-06-02 PROCEDURE — 88305 TISSUE EXAM BY PATHOLOGIST: CPT | Performed by: INTERNAL MEDICINE

## 2023-06-02 RX ORDER — LIDOCAINE HYDROCHLORIDE 10 MG/ML
0.5 INJECTION, SOLUTION EPIDURAL; INFILTRATION; INTRACAUDAL; PERINEURAL ONCE AS NEEDED
Status: DISCONTINUED | OUTPATIENT
Start: 2023-06-02 | End: 2023-06-02 | Stop reason: HOSPADM

## 2023-06-02 RX ORDER — SODIUM CHLORIDE 0.9 % (FLUSH) 0.9 %
10 SYRINGE (ML) INJECTION AS NEEDED
Status: CANCELLED | OUTPATIENT
Start: 2023-06-02

## 2023-06-02 RX ORDER — LIDOCAINE HYDROCHLORIDE 20 MG/ML
INJECTION, SOLUTION EPIDURAL; INFILTRATION; INTRACAUDAL; PERINEURAL AS NEEDED
Status: DISCONTINUED | OUTPATIENT
Start: 2023-06-02 | End: 2023-06-02 | Stop reason: SURG

## 2023-06-02 RX ORDER — PROPOFOL 10 MG/ML
VIAL (ML) INTRAVENOUS AS NEEDED
Status: DISCONTINUED | OUTPATIENT
Start: 2023-06-02 | End: 2023-06-02 | Stop reason: SURG

## 2023-06-02 RX ORDER — SODIUM CHLORIDE 9 MG/ML
500 INJECTION, SOLUTION INTRAVENOUS CONTINUOUS PRN
Status: DISCONTINUED | OUTPATIENT
Start: 2023-06-02 | End: 2023-06-02 | Stop reason: HOSPADM

## 2023-06-02 RX ORDER — SODIUM CHLORIDE 0.9 % (FLUSH) 0.9 %
10 SYRINGE (ML) INJECTION AS NEEDED
Status: DISCONTINUED | OUTPATIENT
Start: 2023-06-02 | End: 2023-06-02 | Stop reason: HOSPADM

## 2023-06-02 RX ORDER — SODIUM CHLORIDE 9 MG/ML
40 INJECTION, SOLUTION INTRAVENOUS AS NEEDED
Status: CANCELLED | OUTPATIENT
Start: 2023-06-02

## 2023-06-02 RX ORDER — SODIUM CHLORIDE 0.9 % (FLUSH) 0.9 %
10 SYRINGE (ML) INJECTION EVERY 12 HOURS SCHEDULED
Status: CANCELLED | OUTPATIENT
Start: 2023-06-02

## 2023-06-02 RX ORDER — SODIUM CHLORIDE 9 MG/ML
100 INJECTION, SOLUTION INTRAVENOUS CONTINUOUS
Status: CANCELLED | OUTPATIENT
Start: 2023-06-02

## 2023-06-02 RX ORDER — SODIUM CHLORIDE 9 MG/ML
1000 INJECTION, SOLUTION INTRAVENOUS CONTINUOUS
Status: DISCONTINUED | OUTPATIENT
Start: 2023-06-02 | End: 2023-06-02 | Stop reason: HOSPADM

## 2023-06-02 RX ADMIN — PROPOFOL INJECTABLE EMULSION 280 MG: 10 INJECTION, EMULSION INTRAVENOUS at 08:23

## 2023-06-02 RX ADMIN — LIDOCAINE HYDROCHLORIDE 100 MG: 20 INJECTION, SOLUTION EPIDURAL; INFILTRATION; INTRACAUDAL; PERINEURAL at 08:23

## 2023-06-02 RX ADMIN — SODIUM CHLORIDE 500 ML: 9 INJECTION, SOLUTION INTRAVENOUS at 07:36

## 2023-06-02 NOTE — ANESTHESIA POSTPROCEDURE EVALUATION
Patient: Madhu Ochoa    Procedure Summary       Date: 06/02/23 Room / Location: [facility] ENDOSCOPY 2 / [facility]    Anesthesia Start: 0820 Anesthesia Stop: 0836    Procedure: COLONOSCOPY WITH ANESTHESIA Diagnosis:       History of adenomatous polyp of colon      (History of adenomatous polyp of colon [Z86.010])    Surgeons: Fernando Jackson DO Provider: Cheryl Choe CRNA    Anesthesia Type: MAC ASA Status: 2            Anesthesia Type: MAC    Vitals  Vitals Value Taken Time   /71 06/02/23 0851   Temp     Pulse 62 06/02/23 0852   Resp 17 06/02/23 0850   SpO2 97 % 06/02/23 0852   Vitals shown include unvalidated device data.        Post Anesthesia Care and Evaluation    Patient location during evaluation: PHASE II  Patient participation: complete - patient participated  Level of consciousness: awake and alert  Pain management: adequate    Airway patency: patent  Anesthetic complications: No anesthetic complications  PONV Status: none  Cardiovascular status: acceptable  Respiratory status: acceptable  Hydration status: acceptable  No anesthesia care post op

## 2023-06-02 NOTE — ANESTHESIA PREPROCEDURE EVALUATION
Anesthesia Evaluation     no history of anesthetic complications:   NPO Solid Status: > 8 hours  NPO Liquid Status: > 2 hours           Airway   Mallampati: II  TM distance: >3 FB  Neck ROM: full  No difficulty expected  Dental      Pulmonary    (+) a smoker Current, asthma,  Cardiovascular   Exercise tolerance: good (4-7 METS)    (+) hypertensionhyperlipidemia  (-) CAD, cardiac stents      Neuro/Psych  (+) psychiatric history Anxiety  (-) seizures, TIA, CVA  GI/Hepatic/Renal/Endo    (+) obesity, GERD, diabetes mellitus (pre diabetes)  (-) liver disease, no renal disease    Musculoskeletal     Abdominal    Substance History      OB/GYN          Other                        Anesthesia Plan    ASA 2     MAC     intravenous induction     Anesthetic plan, risks, benefits, and alternatives have been provided, discussed and informed consent has been obtained with: patient.

## 2023-06-02 NOTE — H&P
[facility] Gastroenterology  Pre Procedure History & Physical    Chief Complaint:   Polyps    Subjective     HPI:   Polyps    Past Medical History:   Past Medical History:   Diagnosis Date    Acid reflux     Allergic     Anxiety     Asthma     Hyperglycemia     Hyperlipidemia     Hypertension     Low back pain     Obesity     Osteopenia     Scoliosis        Past Surgical History:  Past Surgical History:   Procedure Laterality Date    COLONOSCOPY N/A 5/21/2021    Procedure: COLONOSCOPY WITH ANESTHESIA;  Surgeon: Fernando Jackson DO;  Location: [facility] ENDOSCOPY;  Service: Gastroenterology;  Laterality: N/A;  pre: family hx colon polyps  post:diverticulosis, polyp  Roberto Castillo MD      HERNIA REPAIR         Family History:  Family History   Problem Relation Age of Onset    Hypertension Mother     Diabetes Mother     Colon polyps Mother     Hypertension Father     Diabetes Father     Cancer Maternal Grandmother         colon cancer    Colon cancer Maternal Grandmother     Leukemia Maternal Grandfather     No Known Problems Paternal Grandmother     No Known Problems Paternal Grandfather        Social History:   reports that he has been smoking cigarettes. He has a 20.00 pack-year smoking history. He has never used smokeless tobacco. He reports current alcohol use. He reports that he does not use drugs.    Medications:   Prior to Admission medications    Medication Sig Start Date End Date Taking? Authorizing Provider   ALPRAZolam (XANAX) 1 MG tablet Take 1 tablet by mouth As Needed for Anxiety. 12/20/17  Yes Roberto Castillo MD   Cholecalciferol (VITAMIN D3) 5000 UNITS capsule capsule Take 1 capsule by mouth Daily.   Yes Provider, MD Dontrell   fenofibrate (TRICOR) 145 MG tablet Take 1 tablet by mouth Daily. 6/19/17  Yes Roberto Castillo MD   lisinopril-hydrochlorothiazide (PRINZIDE,ZESTORETIC) 20-12.5 MG per tablet Take 1 tablet by mouth Daily. 6/19/17  Yes Roberto Castillo MD   Omeprazole  "Magnesium (PRILOSEC OTC PO) Take  by mouth Daily.   Yes Provider, MD Dontrell   rosuvastatin (CRESTOR) 20 MG tablet Take 1 tablet by mouth Daily.   Yes Provider, MD Dontrell   sertraline (ZOLOFT) 50 MG tablet Take 1 tablet by mouth Daily.   Yes Provider, MD Dontrell   Albuterol Sulfate (VENTOLIN HFA IN) Inhale As Needed.    Provider, Historical, MD       Allergies:  Naprosyn [naproxen]    ROS:    General: Weight stable  Resp: No SOA  Cardiovascular: No CP    Objective     Blood pressure 127/75, pulse 79, temperature 97 °F (36.1 °C), temperature source Temporal, resp. rate 20, height 175.3 cm (69\"), weight 94.8 kg (209 lb), SpO2 100 %.    Physical Exam   Constitutional: Pt is oriented to person, place, and in no distress.   HENT: Mouth/Throat: Oropharynx is clear.   Cardiovascular: Normal rate, regular rhythm.    Pulmonary/Chest: Effort normal. No respiratory distress. No  wheezes.   Abdominal: Soft. Non-distended.  Skin: Skin is warm and dry.   Psychiatric: Mood, memory, affect and judgment appear normal.     Assessment & Plan     Diagnosis:  Polyps    Anticipated Surgical Procedure:  C-scope    The risks, benefits, and alternatives of this procedure have been discussed with the patient or the responsible party- the patient understands and agrees to proceed.        "

## 2023-06-05 LAB
CYTO UR: NORMAL
LAB AP CASE REPORT: NORMAL
LAB AP DIAGNOSIS COMMENT: NORMAL
Lab: NORMAL
PATH REPORT.FINAL DX SPEC: NORMAL
PATH REPORT.GROSS SPEC: NORMAL

## (undated) DEVICE — THE CHANNEL CLEANING BRUSH IS A NYLON FLEXI BRUSH ATTACHED TO A FLEXIBLE PLASTIC SHEATH DESIGNED TO SAFELY REMOVE DEBRIS FROM FLEXIBLE ENDOSCOPES.

## (undated) DEVICE — TBG SMPL FLTR LINE NASL 02/C02 A/ BX/100

## (undated) DEVICE — MASK,OXYGEN,MED CONC,ADLT,7' TUB, UC: Brand: PENDING

## (undated) DEVICE — Device: Brand: DEFENDO AIR/WATER/SUCTION AND BIOPSY VALVE

## (undated) DEVICE — SENSR O2 OXIMAX FNGR A/ 18IN NONSTR

## (undated) DEVICE — THE SINGLE USE ETRAP – POLYP TRAP IS USED FOR SUCTION RETRIEVAL OF ENDOSCOPICALLY REMOVED POLYPS.: Brand: ETRAP

## (undated) DEVICE — YANKAUER,BULB TIP WITH VENT: Brand: ARGYLE

## (undated) DEVICE — SNAR POLYP SENSATION MICRO OVL 13 240X40

## (undated) DEVICE — SNAR POLYP CAPTIVATOR MICROHEX 13 240CM